# Patient Record
Sex: FEMALE | Race: WHITE | NOT HISPANIC OR LATINO | Employment: FULL TIME | URBAN - METROPOLITAN AREA
[De-identification: names, ages, dates, MRNs, and addresses within clinical notes are randomized per-mention and may not be internally consistent; named-entity substitution may affect disease eponyms.]

---

## 2022-04-07 ENCOUNTER — HISTORICAL (OUTPATIENT)
Dept: ADMINISTRATIVE | Facility: HOSPITAL | Age: 47
End: 2022-04-07
Payer: COMMERCIAL

## 2022-04-23 VITALS
HEIGHT: 65 IN | WEIGHT: 127.19 LBS | DIASTOLIC BLOOD PRESSURE: 85 MMHG | SYSTOLIC BLOOD PRESSURE: 120 MMHG | BODY MASS INDEX: 21.19 KG/M2

## 2022-08-01 NOTE — PROGRESS NOTES
[facility]  Breast Surgical Oncology  New Patient Office Visit - H&P       Dr. Hodgson - Plastics     Sarika Smith- OB/GYN     Chief Complaint:   Chief Complaint   Patient presents with    Follow-up     Followup, no complaints        Subjective:      Interval History:   2022 She is doing well in the interval. She currently denies any breast issues including rashes, redness, pain, swelling, nipple discharge, or new lumps/masses. She is still happy with her reconstructive results. She states she is still getting use to it.    History of Present Illness:  Janet Oro is a pleasant female patient who initially presented at age 45 with a strong family history of breast cancer and left breast core needle biopsy revealing an intraductal papilloma. Tyrer Cuzick v8 Breast Cancer Risk Assessment Lifetime risk for breast cancer is 33.9%. She is status post left breast excisional biopsy on 2020, with pathology revealing a sclerosing intraductal papailloma as well as ALH. She is underwent a risk reducing bilateral nipple-sparing mastectomy with tissue expander reconstruction on 2020. Final pathology revealed benign breast changes without evidence of atypia or malignancy in either breast. She underwent implant placement on 2020.      Imagin. 2020 Bilateral SC MG and US at [facility] - revealing Category C breast density, no significant findings on MG, and ultrasound revealing a possible intraductal mass in the subareolar left breast at 3 o'clock and hypoechoic changes in the right axillary tail, likely correlating with stable mammography findings. BIRADS-0: additional imaging was recommended.  2. 2020 Bilateral US at [facility] - right axillary tail at 10 o'clock 8 cm FN revealed probably fibroglandular tissue correlating with stable asymmetry on mammogram (BIRADS-3: probably benign) and left breast at 3 o'clock 1 cm FN a hypoechoic mass which appears  intraductal, likely papilloma is suspicious (BIRADS-4: suspicious and biopsy was recommended).  3. 3/12/2020 Bilateral Breast MRI at [facility] - revealing an enhancing mass measuring 0.4 cm with associated biopsy clop in the left breast at 3 o'clock 1 cm FN, site of biopsy-proven intraductal papilloma measuring 0.4 cm and small enhancing mass measuring up to 0.5 cm in the right breast at 6 o'clock 2 cm FN, and a fairly circumscribed enhancing mass measuring up to 0.6 cm in the right breast lower outer quadrant 4 cm FN.  4. 3/20/2020 Left Breast US at [facility] - revealing nonspecific hypoechoic changes at 56 o'clock 3 cm FN with no identifiable mass correlating to the finding on MRI at 6 o'clock. BIRADS-4: MRI-guided biopsy was recommended.  5. 5/26/2020 Right Breast US at [facility] - biopsy clip from MRI biopsy noted at 5 o'clock 3 cm FN, approximately 0.8 cm lateral to the biopsy clip is some focal hypoechoic change. BIRADS-3: probably benign and MRI follow-up recommended on 3 months given the concern for correlation.        Pathology:   1. 2/20/2020 US-guided Core Needle Biopsy Left Breast 3 o'clock 1 cm FN - Intraductal papilloma  2. 5/14/2020 MRI-guided Core Needle Biopsy Right Breast 6 o'clock - Benign breast tissue fibroadenomatous change, focal sclerosing adenosis, and microfocus of fat necrosis  3. 6/19/2020 Left Excisional Breast Biopsy - SCLEROSING INTRADUCTAL PAPILLOMA, 3 MM GREATEST DIMENSION, WITH SUPERIMPOSED MILD DUCTAL EPITHELIAL HYPERPLASIA THE USUAL TYPE, ADJACENT FOCUS OF ATYPICAL LOBULAR HYPERPLASIA ALSO IDENTIFIED, AND UNINVOLVED BREAST TISSUE SHOWS AN ORGANIZING BIOPSY SITE WITH A GELATINOUS FIDUCIAL MARKER, STROMAL FIBROSIS, CYSTIC DILATATION OF DUCTS AND PERIDUCTAL CHRONIC INFLAMMATION.  3. 8/28/2020 Bilateral NSM with tissue expander reconstruction - Left mastectomy revealed organizing biopsy site with radial sclerosing lesion with duct epithelial hyperplasia, usual type, organizing fat necrosis with benign  lactiferous ducts of the nipple, and right mastectomy revealed benign fibrocystic alterations.      OB / GYN History   Sister - breast ca at age 32  Sister - breast ca at age 36  Maternal Aunt - breast ca at age 60s  Maternal Aunt - breast ca at age 60s      Other History:     Past Medical History:   Diagnosis Date    Hypertension         Past Surgical History:   Procedure Laterality Date    ADENOIDECTOMY      BREAST SURGERY      COSMETIC SURGERY          Social History     Socioeconomic History    Marital status:    Tobacco Use    Smoking status: Never Smoker    Smokeless tobacco: Never Used   Substance and Sexual Activity    Alcohol use: Yes     Alcohol/week: 12.0 standard drinks     Types: 12 Cans of beer per week    Drug use: Never    Sexual activity: Yes          There is no immunization history on file for this patient.     Medications/Allergies:    Current Outpatient Medications on File Prior to Visit   Medication Sig Dispense Refill    lisinopriL 10 MG tablet Take 10 mg by mouth once daily.       No current facility-administered medications on file prior to visit.        Review of patient's allergies indicates:   Allergen Reactions    Bactroban [mupirocin] Itching and Rash        Review of Systems:      Constitutional: denies fevers, chills, weight loss  HEENT: denies blurry/double vision, changes in hearing, odynophagia, dysphagia  Respiratory: denies cough, shortness of breath  Cardiovascular: denies palpitations, swelling of the extremities  GI: denies abdominal pain, nausea/vomiting, hematochezia, frequent stools  : denies frequency, dysuria, flank pain, hematuria  Skin: denies new rashes  Neurological: denies muscular/sensory deficiencies, loss of coordination, headaches, memory changes  Endo: denies hair loss/thinning, nervousness, hot flashes, heat/cold intolerance, lumps in the neck area  Heme: denies easy bruising and fatigue  Psychological: denies anxious/depressive  moods  Musculoskeletal: denies bony pain, muscle cramps, swollen joints       Objective/Physical Exam     Vitals:    08/02/22 1307   BP: 124/80   Pulse: 88   Resp: 20   Temp: 98.8 °F (37.1 °C)        General: The patient is awake, alert and oriented times three. The patient is well nourished and in no acute distress.  Neck: There is no evidence of palpable cervical, supraclavicular or axillary adenopathy. The neck is supple. The thyroid is not enlarged.  Musculoskeletal: The patient has a normal range of motion of her bilateral upper extremities.  Chest: Examination of the chest wall fails to reveal any obvious abnormalities. Nonlabored breathing, symmetric expansion.  Breast:  Right: Examination of right breast fails to reveal any dominant masses or areas of significant focal nodularity. The nipple is everted without evidence of discharge. There is no skin dimpling with movement of the pectoralis. There are no significant skin changes overlying the breast. Implants appear intact upon exam.   Left: Examination of the left breast fails to reveal any dominant masses or areas of significant focal nodularity. The nipple is everted without evidence of discharge. There is no skin dimpling with movement of the pectoralis. There are no significant skin changes overlying the breast. Implants appear to be intact upon exam.   Abdomen: The abdomen is soft, flat, nontender and nondistended.  Integumentary: no rashes or skin lesions present  Neurologic: cranial nerves intact, no signs of peripheral neurological deficit, motor/sensory function intact       Assessment and Plan     There is no problem list on file for this patient.       Janet was seen today for follow-up.    Diagnoses and all orders for this visit:    Atypical lobular hyperplasia of breast    At high risk for breast cancer    S/P mastectomy,  bilateral      -------------------------------------------------------------------------------------------------------------    PLAN:  1. She is s/p bilateral NSM and reconstruction with implants and therefore does not require mammograms. She will RTC in 1 year for CBE.  2. Healthy lifestyle guidelines were reviewed. She was encouraged to engage in regular exercise, maintain a healthy body weight, and avoid excessive alcohol consumption. Healthy nutritional guidelines were also discussed. Self-breast examination was reviewed with the patient in detail and she was encouraged to perform this on a monthly basis.  3. Continue to see OB/GYN for CBE. Recommend two CBE a year. One with us and one with your OB/GYN Provider. We recommend them to be at a six month interval.  She states she sees her OB/GYN in January.   4. Please call our office with any questions or concerns that may arise before the next appointment.       All of her questions were answered.    AYANA Duran

## 2022-08-02 ENCOUNTER — OFFICE VISIT (OUTPATIENT)
Dept: SURGERY | Facility: CLINIC | Age: 47
End: 2022-08-02
Payer: COMMERCIAL

## 2022-08-02 VITALS
BODY MASS INDEX: 22.82 KG/M2 | RESPIRATION RATE: 20 BRPM | HEIGHT: 65 IN | OXYGEN SATURATION: 97 % | WEIGHT: 137 LBS | SYSTOLIC BLOOD PRESSURE: 124 MMHG | HEART RATE: 88 BPM | TEMPERATURE: 99 F | DIASTOLIC BLOOD PRESSURE: 80 MMHG

## 2022-08-02 DIAGNOSIS — Z90.13 S/P MASTECTOMY, BILATERAL: ICD-10-CM

## 2022-08-02 DIAGNOSIS — Z91.89 AT HIGH RISK FOR BREAST CANCER: ICD-10-CM

## 2022-08-02 DIAGNOSIS — N60.99 ATYPICAL LOBULAR HYPERPLASIA OF BREAST: Primary | ICD-10-CM

## 2022-08-02 PROCEDURE — 4010F ACE/ARB THERAPY RXD/TAKEN: CPT | Mod: CPTII,S$GLB,,

## 2022-08-02 PROCEDURE — 3008F BODY MASS INDEX DOCD: CPT | Mod: CPTII,S$GLB,,

## 2022-08-02 PROCEDURE — 1159F PR MEDICATION LIST DOCUMENTED IN MEDICAL RECORD: ICD-10-PCS | Mod: CPTII,S$GLB,,

## 2022-08-02 PROCEDURE — 3079F DIAST BP 80-89 MM HG: CPT | Mod: CPTII,S$GLB,,

## 2022-08-02 PROCEDURE — 3074F SYST BP LT 130 MM HG: CPT | Mod: CPTII,S$GLB,,

## 2022-08-02 PROCEDURE — 3074F PR MOST RECENT SYSTOLIC BLOOD PRESSURE < 130 MM HG: ICD-10-PCS | Mod: CPTII,S$GLB,,

## 2022-08-02 PROCEDURE — 1160F PR REVIEW ALL MEDS BY PRESCRIBER/CLIN PHARMACIST DOCUMENTED: ICD-10-PCS | Mod: CPTII,S$GLB,,

## 2022-08-02 PROCEDURE — 99203 PR OFFICE/OUTPT VISIT, NEW, LEVL III, 30-44 MIN: ICD-10-PCS | Mod: S$GLB,,,

## 2022-08-02 PROCEDURE — 3008F PR BODY MASS INDEX (BMI) DOCUMENTED: ICD-10-PCS | Mod: CPTII,S$GLB,,

## 2022-08-02 PROCEDURE — 99999 PR PBB SHADOW E&M-EST. PATIENT-LVL IV: ICD-10-PCS | Mod: PBBFAC,,,

## 2022-08-02 PROCEDURE — 1160F RVW MEDS BY RX/DR IN RCRD: CPT | Mod: CPTII,S$GLB,,

## 2022-08-02 PROCEDURE — 3079F PR MOST RECENT DIASTOLIC BLOOD PRESSURE 80-89 MM HG: ICD-10-PCS | Mod: CPTII,S$GLB,,

## 2022-08-02 PROCEDURE — 4010F PR ACE/ARB THEARPY RXD/TAKEN: ICD-10-PCS | Mod: CPTII,S$GLB,,

## 2022-08-02 PROCEDURE — 1159F MED LIST DOCD IN RCRD: CPT | Mod: CPTII,S$GLB,,

## 2022-08-02 PROCEDURE — 99203 OFFICE O/P NEW LOW 30 MIN: CPT | Mod: S$GLB,,,

## 2022-08-02 PROCEDURE — 99999 PR PBB SHADOW E&M-EST. PATIENT-LVL IV: CPT | Mod: PBBFAC,,,

## 2022-08-02 RX ORDER — LISINOPRIL 10 MG/1
10 TABLET ORAL DAILY
COMMUNITY
Start: 2022-05-06